# Patient Record
(demographics unavailable — no encounter records)

---

## 2025-05-21 NOTE — BEGINNING OF VISIT
[Patient] : patient [] :  [Other: ______] : provided by JENNA [Time Spent: ____ minutes] : Total time spent using  services: [unfilled] minutes. The patient's primary language is not English thus required  services. [TWNoteComboBox1] : Tristanian

## 2025-05-21 NOTE — RISK ASSESSMENT
[Grade: ____] : Grade: [unfilled] [Has ways to cope with stress] : has ways to cope with stress [Displays self-confidence] : displays self-confidence [Gets depressed, anxious, or irritable/has mood swings] : gets depressed, anxious, or irritable/has mood swings [With Teen] : teen [Uses tobacco] : does not use tobacco [Uses drugs] : does not use drugs  [Drinks alcohol] : does not drink alcohol [Has had sexual intercourse] : has not had sexual intercourse [Has problems with sleep] : does not have problems with sleep [Has thought about hurting self or considered suicide] : has not thought about hurting self or considered suicide [de-identified] : lives with her mother and stepfather  [de-identified] : denies sadness, depression or SI

## 2025-05-21 NOTE — DISCUSSION/SUMMARY
[FreeTextEntry1] : 17yr old female pt here for vaccine visit, new to clinic.   Reviewed vaccine record from her PMD in [place] moved from.  Pt needs IPV vaccines to complete series and all other vaccines completed.   [facility] consent forms provided for IPV and COVID vaccines.  Pt reported she did have a PE with lab work at a hospital 2 months ago and everything was fine.  RTC in 2 weeks for vaccine appt or sooner PRN 
resilient/elastic

## 2025-05-21 NOTE — HISTORY OF PRESENT ILLNESS
[de-identified] : vaccines [FreeTextEntry6] : 17yr old female pt here for vaccines. Pt is new to this clinic.  Pt is from [facility] and immigrated 2/3/2024 here.   Pt does not have a Pediatrician here.